# Patient Record
Sex: FEMALE | Race: OTHER
[De-identification: names, ages, dates, MRNs, and addresses within clinical notes are randomized per-mention and may not be internally consistent; named-entity substitution may affect disease eponyms.]

---

## 2019-12-05 ENCOUNTER — HOSPITAL ENCOUNTER (OUTPATIENT)
Dept: HOSPITAL 15 - RAD HDHVI | Age: 69
Discharge: HOME | End: 2019-12-05
Attending: INTERNAL MEDICINE
Payer: MEDICARE

## 2019-12-05 VITALS — HEIGHT: 64 IN | WEIGHT: 118 LBS | BODY MASS INDEX: 20.14 KG/M2

## 2019-12-05 DIAGNOSIS — E78.00: ICD-10-CM

## 2019-12-05 DIAGNOSIS — E11.9: Primary | ICD-10-CM

## 2019-12-05 DIAGNOSIS — I10: ICD-10-CM

## 2019-12-05 DIAGNOSIS — E78.5: ICD-10-CM

## 2019-12-05 PROCEDURE — 93017 CV STRESS TEST TRACING ONLY: CPT

## 2019-12-05 PROCEDURE — 78452 HT MUSCLE IMAGE SPECT MULT: CPT

## 2019-12-05 PROCEDURE — 96374 THER/PROPH/DIAG INJ IV PUSH: CPT

## 2019-12-18 ENCOUNTER — HOSPITAL ENCOUNTER (OUTPATIENT)
Dept: HOSPITAL 15 - RAD HDHVI | Age: 69
Discharge: HOME | End: 2019-12-18
Attending: INTERNAL MEDICINE
Payer: MEDICARE

## 2019-12-18 DIAGNOSIS — R00.2: ICD-10-CM

## 2019-12-18 DIAGNOSIS — I08.1: Primary | ICD-10-CM

## 2019-12-18 DIAGNOSIS — R07.9: ICD-10-CM

## 2019-12-18 PROCEDURE — 93306 TTE W/DOPPLER COMPLETE: CPT

## 2020-07-08 ENCOUNTER — HOSPITAL ENCOUNTER (INPATIENT)
Dept: HOSPITAL 15 - TELE-WESTW | Age: 70
LOS: 7 days | Discharge: ANOTHER HOSPITAL | DRG: 287 | End: 2020-07-15
Attending: INTERNAL MEDICINE | Admitting: INTERNAL MEDICINE
Payer: MEDICARE

## 2020-07-08 VITALS — WEIGHT: 127.43 LBS | BODY MASS INDEX: 23.45 KG/M2 | HEIGHT: 62 IN

## 2020-07-08 VITALS — SYSTOLIC BLOOD PRESSURE: 118 MMHG | DIASTOLIC BLOOD PRESSURE: 74 MMHG

## 2020-07-08 DIAGNOSIS — K21.9: ICD-10-CM

## 2020-07-08 DIAGNOSIS — Z79.84: ICD-10-CM

## 2020-07-08 DIAGNOSIS — I10: ICD-10-CM

## 2020-07-08 DIAGNOSIS — E78.5: ICD-10-CM

## 2020-07-08 DIAGNOSIS — I25.110: Primary | ICD-10-CM

## 2020-07-08 DIAGNOSIS — Z03.818: ICD-10-CM

## 2020-07-08 DIAGNOSIS — E11.9: ICD-10-CM

## 2020-07-08 PROCEDURE — 99152 MOD SED SAME PHYS/QHP 5/>YRS: CPT

## 2020-07-08 PROCEDURE — 86850 RBC ANTIBODY SCREEN: CPT

## 2020-07-08 PROCEDURE — 93970 EXTREMITY STUDY: CPT

## 2020-07-08 PROCEDURE — 36224 PLACE CATH CAROTD ART: CPT

## 2020-07-08 PROCEDURE — 85610 PROTHROMBIN TIME: CPT

## 2020-07-08 PROCEDURE — 93005 ELECTROCARDIOGRAM TRACING: CPT

## 2020-07-08 PROCEDURE — 85025 COMPLETE CBC W/AUTO DIFF WBC: CPT

## 2020-07-08 PROCEDURE — 93458 L HRT ARTERY/VENTRICLE ANGIO: CPT

## 2020-07-08 PROCEDURE — 71045 X-RAY EXAM CHEST 1 VIEW: CPT

## 2020-07-08 PROCEDURE — 36227 PLACE CATH XTRNL CAROTID: CPT

## 2020-07-08 PROCEDURE — 85730 THROMBOPLASTIN TIME PARTIAL: CPT

## 2020-07-08 PROCEDURE — 82962 GLUCOSE BLOOD TEST: CPT

## 2020-07-08 PROCEDURE — 81001 URINALYSIS AUTO W/SCOPE: CPT

## 2020-07-08 PROCEDURE — 80053 COMPREHEN METABOLIC PANEL: CPT

## 2020-07-08 PROCEDURE — 36415 COLL VENOUS BLD VENIPUNCTURE: CPT

## 2020-07-08 PROCEDURE — 86901 BLOOD TYPING SEROLOGIC RH(D): CPT

## 2020-07-08 PROCEDURE — 86900 BLOOD TYPING SEROLOGIC ABO: CPT

## 2020-07-08 PROCEDURE — 80048 BASIC METABOLIC PNL TOTAL CA: CPT

## 2020-07-08 NOTE — NUR
Direct Admit from [facility]

YING SAUCEDO admitted to Telemetry unit after SBAR received from DILLON Tyson from [facility].  
Patient oriented to MARTA BOLAÑOS, RN primary RN, unit, room, bed, and unit policies 
regarding patient care and visiting hours. Patient was placed on continuous telemetry 
monitoring, tele box #64  and telemetry reading is SR77 . Patient placed on bedside oxygen, 
weighed by bedscale and encouraged to call if they need something. All questions and 
concerns addressed, patient verbalized understanding.

## 2020-07-09 VITALS — SYSTOLIC BLOOD PRESSURE: 104 MMHG | DIASTOLIC BLOOD PRESSURE: 60 MMHG

## 2020-07-09 VITALS — SYSTOLIC BLOOD PRESSURE: 108 MMHG | DIASTOLIC BLOOD PRESSURE: 69 MMHG

## 2020-07-09 VITALS — SYSTOLIC BLOOD PRESSURE: 130 MMHG | DIASTOLIC BLOOD PRESSURE: 70 MMHG

## 2020-07-09 VITALS — SYSTOLIC BLOOD PRESSURE: 131 MMHG | DIASTOLIC BLOOD PRESSURE: 82 MMHG

## 2020-07-09 LAB
ALBUMIN SERPL-MCNC: 4 G/DL (ref 3.4–5)
ALP SERPL-CCNC: 85 U/L (ref 45–117)
ALT SERPL-CCNC: 29 U/L (ref 13–56)
ANION GAP SERPL CALCULATED.3IONS-SCNC: 4 MMOL/L (ref 5–15)
APTT PPP: 106.5 SEC (ref 23.64–32.05)
APTT PPP: 27.9 SEC (ref 23.64–32.05)
APTT PPP: 40.6 SEC (ref 23.64–32.05)
APTT PPP: 40.9 SEC (ref 23.64–32.05)
BILIRUB SERPL-MCNC: 0.7 MG/DL (ref 0.2–1)
BUN SERPL-MCNC: 18 MG/DL (ref 7–18)
BUN/CREAT SERPL: 23.4
CALCIUM SERPL-MCNC: 9.5 MG/DL (ref 8.5–10.1)
CHLORIDE SERPL-SCNC: 106 MMOL/L (ref 98–107)
CO2 SERPL-SCNC: 29 MMOL/L (ref 21–32)
GLUCOSE SERPL-MCNC: 113 MG/DL (ref 74–106)
HCT VFR BLD AUTO: 41.2 % (ref 36–46)
HCT VFR BLD AUTO: 41.4 % (ref 36–46)
HCT VFR BLD AUTO: 44.9 % (ref 36–46)
HGB BLD-MCNC: 13.9 G/DL (ref 12.2–16.2)
HGB BLD-MCNC: 14.1 G/DL (ref 12.2–16.2)
HGB BLD-MCNC: 15.2 G/DL (ref 12.2–16.2)
INR PPP: 1.02 (ref 0.9–1.15)
INR PPP: 1.07 (ref 0.9–1.15)
INR PPP: 1.08 (ref 0.9–1.15)
INR PPP: 1.13 (ref 0.9–1.15)
MCH RBC QN AUTO: 30 PG (ref 28–32)
MCH RBC QN AUTO: 30.1 PG (ref 28–32)
MCH RBC QN AUTO: 30.5 PG (ref 28–32)
MCV RBC AUTO: 88.8 FL (ref 80–100)
MCV RBC AUTO: 89 FL (ref 80–100)
MCV RBC AUTO: 89.4 FL (ref 80–100)
NRBC BLD QL AUTO: 0 %
NRBC BLD QL AUTO: 0.1 %
NRBC BLD QL AUTO: 0.8 %
POTASSIUM SERPL-SCNC: 4.4 MMOL/L (ref 3.5–5.1)
PROT SERPL-MCNC: 8 G/DL (ref 6.4–8.2)
SODIUM SERPL-SCNC: 139 MMOL/L (ref 136–145)

## 2020-07-09 PROCEDURE — 4A023N7 MEASUREMENT OF CARDIAC SAMPLING AND PRESSURE, LEFT HEART, PERCUTANEOUS APPROACH: ICD-10-PCS | Performed by: INTERNAL MEDICINE

## 2020-07-09 PROCEDURE — B31C1ZZ FLUOROSCOPY OF BILATERAL EXTERNAL CAROTID ARTERIES USING LOW OSMOLAR CONTRAST: ICD-10-PCS | Performed by: INTERNAL MEDICINE

## 2020-07-09 PROCEDURE — B2111ZZ FLUOROSCOPY OF MULTIPLE CORONARY ARTERIES USING LOW OSMOLAR CONTRAST: ICD-10-PCS | Performed by: INTERNAL MEDICINE

## 2020-07-09 PROCEDURE — B3151ZZ FLUOROSCOPY OF BILATERAL COMMON CAROTID ARTERIES USING LOW OSMOLAR CONTRAST: ICD-10-PCS | Performed by: INTERNAL MEDICINE

## 2020-07-09 PROCEDURE — B3181ZZ FLUOROSCOPY OF BILATERAL INTERNAL CAROTID ARTERIES USING LOW OSMOLAR CONTRAST: ICD-10-PCS | Performed by: INTERNAL MEDICINE

## 2020-07-09 PROCEDURE — B2151ZZ FLUOROSCOPY OF LEFT HEART USING LOW OSMOLAR CONTRAST: ICD-10-PCS | Performed by: INTERNAL MEDICINE

## 2020-07-09 PROCEDURE — B3121ZZ FLUOROSCOPY OF LEFT SUBCLAVIAN ARTERY USING LOW OSMOLAR CONTRAST: ICD-10-PCS | Performed by: INTERNAL MEDICINE

## 2020-07-09 RX ADMIN — PANTOPRAZOLE SODIUM SCH MG: 40 INJECTION, POWDER, FOR SOLUTION INTRAVENOUS at 15:16

## 2020-07-09 RX ADMIN — ALUMINUM HYDROXIDE, MAGNESIUM HYDROXIDE, AND SIMETHICONE PRN ML: 200; 200; 20 SUSPENSION ORAL at 22:33

## 2020-07-09 RX ADMIN — SODIUM CHLORIDE SCH ML: 9 INJECTION INTRAMUSCULAR; INTRAVENOUS; SUBCUTANEOUS at 05:48

## 2020-07-09 RX ADMIN — Medication SCH STRIP: at 22:19

## 2020-07-09 RX ADMIN — HYDROCODONE BITARTRATE AND ACETAMINOPHEN PRN TAB: 5; 325 TABLET ORAL at 13:38

## 2020-07-09 RX ADMIN — HEPARIN SODIUM AND DEXTROSE SCH MLS/HR: 10000; 5 INJECTION INTRAVENOUS at 15:51

## 2020-07-09 RX ADMIN — Medication SCH STRIP: at 16:50

## 2020-07-09 RX ADMIN — HUMAN INSULIN SCH UNITS: 100 INJECTION, SOLUTION SUBCUTANEOUS at 22:00

## 2020-07-09 RX ADMIN — MORPHINE SULFATE PRN MG: 2 INJECTION, SOLUTION INTRAMUSCULAR; INTRAVENOUS at 19:10

## 2020-07-09 RX ADMIN — SODIUM CHLORIDE SCH ML: 9 INJECTION INTRAMUSCULAR; INTRAVENOUS; SUBCUTANEOUS at 22:19

## 2020-07-09 RX ADMIN — SODIUM CHLORIDE SCH MLS/HR: 0.9 INJECTION, SOLUTION INTRAVENOUS at 14:35

## 2020-07-09 RX ADMIN — ALUMINUM HYDROXIDE, MAGNESIUM HYDROXIDE, AND SIMETHICONE PRN ML: 200; 200; 20 SUSPENSION ORAL at 22:26

## 2020-07-09 RX ADMIN — SODIUM CHLORIDE SCH ML: 9 INJECTION INTRAMUSCULAR; INTRAVENOUS; SUBCUTANEOUS at 14:35

## 2020-07-09 RX ADMIN — HUMAN INSULIN SCH UNITS: 100 INJECTION, SOLUTION SUBCUTANEOUS at 16:49

## 2020-07-09 RX ADMIN — SODIUM CHLORIDE SCH MLS/HR: 0.9 INJECTION, SOLUTION INTRAVENOUS at 01:16

## 2020-07-09 NOTE — NUR
Patient sat up in bed, the Cath site in Right groin has gauze that is minimally saturated. I 
told patient that she should not attempt to walk quite yet because she is on the heparin 
drip and the groin site took a while to clot. Patient was put on a bed pan to attempt to 
urinate.

## 2020-07-09 NOTE — NUR
Spoke to the patient to reiterate that she is under a physicians care and currently 
receiving Heparin post heart cath. She then paused our discussion and called her sister. 
They added the password "Nurse" to the account. Explained to the family that the physician 
has placed a consult for the cardiothoracic surgeon and she has yet to be seen. I explained 
the risk of leaving AMA, and that she should remain and make her wishes known to the 
physician. Paged the physician for medication for anxiety, and acid reflux. Awaiting orders.

## 2020-07-09 NOTE — NUR
Patient aPTT 40.9, Heparin increased by 2 mL/hr rate now 10 mL/hr. Next draw will be at 
0515. Will continue to monitor.

## 2020-07-09 NOTE — NUR
Patient complaining of pain. Patient requesting to transfer to [facility]. Patient 
medicated for pain. Will continue to monitor.

## 2020-07-09 NOTE — NUR
RESP

UNABLE TO PERFORM BED SIDE SPIROMETRY DUE TO PT NOT BEING AWAKE ENOUGH TO FOLLOW 
INSTRUCTIONS FOR TEST.

## 2020-07-09 NOTE — NUR
paged dr cortez for clarification on heparin drip, abhi wants to know if he wants them to 
use the heparin protocol here. Also need to know if dr cortez is ok with bedside spirometry 
instead of PFT.

## 2020-07-09 NOTE — NUR
Patient's  called requesting information. No password on the account. Family wanting 
to know how can the patient transfer to [facility]. I explained the physician needs 
to request the transfer so that another physician can accept the case. I also explained if 
the patient leaves AMA there is a risk to leaving the hospital against medical advice. I 
informed the  that the cardiothoracic surgeon has been consulted and she has yet to 
be seen.  verbalized understanding. Will continue to  monitor.

## 2020-07-10 VITALS — SYSTOLIC BLOOD PRESSURE: 104 MMHG | DIASTOLIC BLOOD PRESSURE: 62 MMHG

## 2020-07-10 VITALS — SYSTOLIC BLOOD PRESSURE: 138 MMHG | DIASTOLIC BLOOD PRESSURE: 78 MMHG

## 2020-07-10 VITALS — DIASTOLIC BLOOD PRESSURE: 90 MMHG | SYSTOLIC BLOOD PRESSURE: 141 MMHG

## 2020-07-10 VITALS — SYSTOLIC BLOOD PRESSURE: 107 MMHG | DIASTOLIC BLOOD PRESSURE: 59 MMHG

## 2020-07-10 VITALS — SYSTOLIC BLOOD PRESSURE: 123 MMHG | DIASTOLIC BLOOD PRESSURE: 78 MMHG

## 2020-07-10 LAB
ANION GAP SERPL CALCULATED.3IONS-SCNC: 8 MMOL/L (ref 5–15)
APTT PPP: 25.6 SEC (ref 23.64–32.05)
BUN SERPL-MCNC: 8 MG/DL (ref 7–18)
BUN/CREAT SERPL: 14.5
CALCIUM SERPL-MCNC: 8.4 MG/DL (ref 8.5–10.1)
CHLORIDE SERPL-SCNC: 107 MMOL/L (ref 98–107)
CO2 SERPL-SCNC: 23 MMOL/L (ref 21–32)
GLUCOSE SERPL-MCNC: 154 MG/DL (ref 74–106)
HCT VFR BLD AUTO: 41.9 % (ref 36–46)
HGB BLD-MCNC: 14 G/DL (ref 12.2–16.2)
INR PPP: 1.04 (ref 0.9–1.15)
MCH RBC QN AUTO: 30.1 PG (ref 28–32)
MCV RBC AUTO: 90.1 FL (ref 80–100)
NRBC BLD QL AUTO: 0.1 %
POTASSIUM SERPL-SCNC: 3.4 MMOL/L (ref 3.5–5.1)
SODIUM SERPL-SCNC: 138 MMOL/L (ref 136–145)

## 2020-07-10 RX ADMIN — ALUMINUM HYDROXIDE, MAGNESIUM HYDROXIDE, AND SIMETHICONE PRN ML: 200; 200; 20 SUSPENSION ORAL at 06:06

## 2020-07-10 RX ADMIN — SODIUM CHLORIDE SCH ML: 9 INJECTION INTRAMUSCULAR; INTRAVENOUS; SUBCUTANEOUS at 23:48

## 2020-07-10 RX ADMIN — Medication SCH STRIP: at 21:27

## 2020-07-10 RX ADMIN — HUMAN INSULIN SCH UNITS: 100 INJECTION, SOLUTION SUBCUTANEOUS at 21:40

## 2020-07-10 RX ADMIN — SODIUM CHLORIDE SCH MLS/HR: 0.9 INJECTION, SOLUTION INTRAVENOUS at 21:28

## 2020-07-10 RX ADMIN — NITROGLYCERIN SCH PATCH: 0.2 PATCH TRANSDERMAL at 09:48

## 2020-07-10 RX ADMIN — HUMAN INSULIN SCH UNITS: 100 INJECTION, SOLUTION SUBCUTANEOUS at 12:30

## 2020-07-10 RX ADMIN — SODIUM CHLORIDE SCH MLS/HR: 0.9 INJECTION, SOLUTION INTRAVENOUS at 02:55

## 2020-07-10 RX ADMIN — AMIODARONE HYDROCHLORIDE SCH MG: 200 TABLET ORAL at 09:48

## 2020-07-10 RX ADMIN — HEPARIN SODIUM AND DEXTROSE SCH MLS/HR: 10000; 5 INJECTION INTRAVENOUS at 12:52

## 2020-07-10 RX ADMIN — SODIUM CHLORIDE SCH ML: 9 INJECTION INTRAMUSCULAR; INTRAVENOUS; SUBCUTANEOUS at 06:06

## 2020-07-10 RX ADMIN — MORPHINE SULFATE PRN MG: 2 INJECTION, SOLUTION INTRAMUSCULAR; INTRAVENOUS at 03:25

## 2020-07-10 RX ADMIN — HUMAN INSULIN SCH UNITS: 100 INJECTION, SOLUTION SUBCUTANEOUS at 07:00

## 2020-07-10 RX ADMIN — HUMAN INSULIN SCH UNITS: 100 INJECTION, SOLUTION SUBCUTANEOUS at 17:15

## 2020-07-10 RX ADMIN — MORPHINE SULFATE PRN MG: 2 INJECTION, SOLUTION INTRAMUSCULAR; INTRAVENOUS at 00:28

## 2020-07-10 RX ADMIN — SODIUM CHLORIDE SCH ML: 9 INJECTION INTRAMUSCULAR; INTRAVENOUS; SUBCUTANEOUS at 21:27

## 2020-07-10 RX ADMIN — PANTOPRAZOLE SODIUM SCH MG: 40 INJECTION, POWDER, FOR SOLUTION INTRAVENOUS at 09:47

## 2020-07-10 RX ADMIN — SODIUM CHLORIDE SCH MLS/HR: 0.9 INJECTION, SOLUTION INTRAVENOUS at 16:25

## 2020-07-10 RX ADMIN — Medication SCH STRIP: at 17:14

## 2020-07-10 RX ADMIN — Medication SCH STRIP: at 06:58

## 2020-07-10 RX ADMIN — ALUMINUM HYDROXIDE, MAGNESIUM HYDROXIDE, AND SIMETHICONE PRN ML: 200; 200; 20 SUSPENSION ORAL at 17:31

## 2020-07-10 RX ADMIN — SODIUM CHLORIDE SCH ML: 9 INJECTION INTRAMUSCULAR; INTRAVENOUS; SUBCUTANEOUS at 14:24

## 2020-07-10 RX ADMIN — Medication SCH STRIP: at 12:30

## 2020-07-10 NOTE — NUR
Tried to call Kylah and put [facility] on will call, but there was no answer and voicemail is 
full.

-------------------------------------------------------------------------------

Addendum: 07/10/20 at 1620 by ERIC SAHA RN RN

-------------------------------------------------------------------------------

[place]

## 2020-07-10 NOTE — NUR
Received a call that the patient is being transferred to [place] for cardiothoracic 
with Dr. Blandon. Paged Dr. Zaman for discharge orders and to advise if the patient and her 
family have been notified.

## 2020-07-10 NOTE — NUR
Received a call from [facility] transfer [place]. The accepting physician is Dr. Janes Ocampo. 
Transfer center phone number is [phone]. Fax # is [phone]. Faxed over the face 
sheet, H&P, and the last progress note. Rep said she will work on the transfer on Monday.

## 2020-07-10 NOTE — NUR
PATIENT UP TO BATHROOM, PATIENT EXCLAIMED SHE HEARD A POP IN HER RIGHT GROIN. PATIENT HAS A 
SMALL HEMATOMA WITH A SMALL TRICKLE BLEED. PRESSURE APPLIED TO SITE TO STOP BLEEDING. PAGING 
DR. YAN. AWAITING ORDERS.

## 2020-07-10 NOTE — NUR
Endorsed care to Christal CARRANZA, examined Right groin. Right groin no longer bleeding. Awaiting 
orders.

## 2020-07-10 NOTE — NUR
6509 7/10/20 - Contacted by Dr Blandon's nurse Jasmina, who request clinicals and face sheet on 
patient. Jasmina stated if bed becomes available today, she will transfer patient to [place].  I faxed to Jasmina at [phone] requested documents.

## 2020-07-10 NOTE — NUR
1547 7/10/20 - Contacted by [facility] transfer center, patient has been 
accepted and assigned bed 222-A main tele unit, nurse's station [phone]. Address is 
[address] [place], [place] [id number].  Please send CD angiogram with patient.  
Will place patient on [facility] will call.

## 2020-07-10 NOTE — NUR
Patient complained of CP 7/10, non-radiating. EKG performed, patient placed on 1L NC, and 
morphine provided. Patient currently resting in bed with eyes closed free of distress. Will 
continue to monitor.

## 2020-07-10 NOTE — NUR
Opening Shift Note

Assumed care of patient, awake and alert/oriented x4. 

No S/S of distress/SOB or pain. Bed in lowest locked position, safety precautions in place, 
call light within reach.

Instructed on POC and to call for assist PRN, will continue to monitor for changes Q1hr and 
PRN.

## 2020-07-10 NOTE — NUR
[facility] called needed the medicare form and the correct Medicare #. Put transport on will call 
because the patient is not decided on location.

## 2020-07-10 NOTE — NUR
Dr Zaman called and spoke to the patient about the transfer. Patient states that is not sure 
if she wants to be transferred to [place] [facility] and may want to go to [facility] 
instead. Dr Zaman informed the patient that it would take a week to be transferred to 
[facility]. Patient is going to call her family and let me know her decision.

## 2020-07-11 VITALS — DIASTOLIC BLOOD PRESSURE: 74 MMHG | SYSTOLIC BLOOD PRESSURE: 119 MMHG

## 2020-07-11 VITALS — SYSTOLIC BLOOD PRESSURE: 133 MMHG | DIASTOLIC BLOOD PRESSURE: 79 MMHG

## 2020-07-11 VITALS — DIASTOLIC BLOOD PRESSURE: 68 MMHG | SYSTOLIC BLOOD PRESSURE: 119 MMHG

## 2020-07-11 VITALS — DIASTOLIC BLOOD PRESSURE: 77 MMHG | SYSTOLIC BLOOD PRESSURE: 117 MMHG

## 2020-07-11 RX ADMIN — MORPHINE SULFATE PRN MG: 2 INJECTION, SOLUTION INTRAMUSCULAR; INTRAVENOUS at 08:10

## 2020-07-11 RX ADMIN — HUMAN INSULIN SCH UNITS: 100 INJECTION, SOLUTION SUBCUTANEOUS at 17:44

## 2020-07-11 RX ADMIN — Medication SCH STRIP: at 06:06

## 2020-07-11 RX ADMIN — Medication SCH STRIP: at 11:59

## 2020-07-11 RX ADMIN — SODIUM CHLORIDE SCH MLS/HR: 0.9 INJECTION, SOLUTION INTRAVENOUS at 17:45

## 2020-07-11 RX ADMIN — HEPARIN SODIUM AND DEXTROSE SCH MLS/HR: 10000; 5 INJECTION INTRAVENOUS at 12:52

## 2020-07-11 RX ADMIN — SODIUM CHLORIDE SCH ML: 9 INJECTION INTRAMUSCULAR; INTRAVENOUS; SUBCUTANEOUS at 21:56

## 2020-07-11 RX ADMIN — HYDROCODONE BITARTRATE AND ACETAMINOPHEN PRN TAB: 5; 325 TABLET ORAL at 20:40

## 2020-07-11 RX ADMIN — HUMAN INSULIN SCH UNITS: 100 INJECTION, SOLUTION SUBCUTANEOUS at 22:02

## 2020-07-11 RX ADMIN — Medication SCH STRIP: at 17:44

## 2020-07-11 RX ADMIN — HUMAN INSULIN SCH UNITS: 100 INJECTION, SOLUTION SUBCUTANEOUS at 11:59

## 2020-07-11 RX ADMIN — PANTOPRAZOLE SODIUM SCH MG: 40 INJECTION, POWDER, FOR SOLUTION INTRAVENOUS at 09:37

## 2020-07-11 RX ADMIN — HUMAN INSULIN SCH UNITS: 100 INJECTION, SOLUTION SUBCUTANEOUS at 06:06

## 2020-07-11 RX ADMIN — SODIUM CHLORIDE SCH ML: 9 INJECTION INTRAMUSCULAR; INTRAVENOUS; SUBCUTANEOUS at 15:38

## 2020-07-11 RX ADMIN — ALUMINUM HYDROXIDE, MAGNESIUM HYDROXIDE, AND SIMETHICONE PRN ML: 200; 200; 20 SUSPENSION ORAL at 07:14

## 2020-07-11 RX ADMIN — ALUMINUM HYDROXIDE, MAGNESIUM HYDROXIDE, AND SIMETHICONE PRN ML: 200; 200; 20 SUSPENSION ORAL at 20:39

## 2020-07-11 RX ADMIN — HYDROCODONE BITARTRATE AND ACETAMINOPHEN PRN TAB: 5; 325 TABLET ORAL at 15:38

## 2020-07-11 RX ADMIN — Medication SCH STRIP: at 21:56

## 2020-07-11 RX ADMIN — AMIODARONE HYDROCHLORIDE SCH MG: 200 TABLET ORAL at 09:38

## 2020-07-11 RX ADMIN — NITROGLYCERIN SCH PATCH: 0.2 PATCH TRANSDERMAL at 09:38

## 2020-07-11 NOTE — NUR
Received a call from [facility]. The representative Minip checked on Pt's 
status and confirmed that a bed will be possibly available on Monday.

## 2020-07-11 NOTE — NUR
RECEIVED REPORT FROM NIGHT NURSE. PATIENT RESTING IN BED, STATES THAT SHE FEELS LIKE SHE HAS 
INDIGESTION/ HEART BURN. WILL CONTINUE TO MONITOR.

## 2020-07-11 NOTE — NUR
Pain

Patient complaints on right middle abdominal pain and pain just below her right shoulder 
blade. Pain medication given and and hot packs offered. Will continue to monitor.

## 2020-07-11 NOTE — NUR
END OF SHIFT

PT. RESTING IN BED.

NO S/S DISTRESS AT THIS TIME.

PT. REPORTS HEARTBURN. MILK AND MAYLOX GIVEN PRIOR TO LEAVING. PT. STATES SHE IS FEELING 
COMFORTABLE. 

CARE ENDORSED TO DAY RN.

## 2020-07-11 NOTE — NUR
Opening Shift Note

Assumed care of patient. Patient is awake, alert, and oriented X 4.  No S/S of respiratory 
distress noted or reported. Patient reports RL abd and upper back pain 6 out 0f 10. Pain 
will be addressed per dr's order. 2 LPM NC. Patient safety measures in place bed in lowest 
position, brakes are locked, side rails up x 2, and call light within reach.  Patent 
instructed on POC and to call for assistance as needed. Will continue to monitor for changes 
Q1hr and PRN.

## 2020-07-11 NOTE — NUR
CHEST PAIN/ ABD PAIN

PATIENT STETS THAT SHE IS HAVING CHEST PAIN, LOWER RIGHT ABDOMINAL PAIN. PATIENT STATES THAT 
MORPHINE HELPS AND THAT THE MAALOX DID NOT HELP MUCH. MORPHINE GIVEN. WILL CONTINUE TO 
MONITOR.

## 2020-07-11 NOTE — NUR
CHEST PAIN/ ABD PAIN

PATIENT STATES SHE IS FEELING MUCH BETTER AND WANTS TO WALK AROUND FOR A WHILE. WILL 
CONTINUE TO MONITOR.

## 2020-07-11 NOTE — NUR
CHEST PAIN/ ABD PAIN

EKG TAKEN. VITALS STABLE /89, HR 93, O2 100% ON 2 L, RR 18. NITRO SUBLINGUAL GIVEN, 
SCHEDULED MORNING MEDICATIONS GIVEN. WILL CONTINUE TO MONITOR.

## 2020-07-12 VITALS — DIASTOLIC BLOOD PRESSURE: 63 MMHG | SYSTOLIC BLOOD PRESSURE: 96 MMHG

## 2020-07-12 VITALS — DIASTOLIC BLOOD PRESSURE: 78 MMHG | SYSTOLIC BLOOD PRESSURE: 115 MMHG

## 2020-07-12 VITALS — SYSTOLIC BLOOD PRESSURE: 96 MMHG | DIASTOLIC BLOOD PRESSURE: 63 MMHG

## 2020-07-12 VITALS — DIASTOLIC BLOOD PRESSURE: 59 MMHG | SYSTOLIC BLOOD PRESSURE: 95 MMHG

## 2020-07-12 VITALS — DIASTOLIC BLOOD PRESSURE: 74 MMHG | SYSTOLIC BLOOD PRESSURE: 115 MMHG

## 2020-07-12 RX ADMIN — PANTOPRAZOLE SODIUM SCH MG: 40 INJECTION, POWDER, FOR SOLUTION INTRAVENOUS at 09:40

## 2020-07-12 RX ADMIN — SODIUM CHLORIDE SCH ML: 9 INJECTION INTRAMUSCULAR; INTRAVENOUS; SUBCUTANEOUS at 22:54

## 2020-07-12 RX ADMIN — SODIUM CHLORIDE SCH ML: 9 INJECTION INTRAMUSCULAR; INTRAVENOUS; SUBCUTANEOUS at 05:40

## 2020-07-12 RX ADMIN — HYDROCODONE BITARTRATE AND ACETAMINOPHEN PRN TAB: 5; 325 TABLET ORAL at 13:40

## 2020-07-12 RX ADMIN — SODIUM CHLORIDE SCH ML: 9 INJECTION INTRAMUSCULAR; INTRAVENOUS; SUBCUTANEOUS at 14:04

## 2020-07-12 RX ADMIN — NITROGLYCERIN SCH PATCH: 0.2 PATCH TRANSDERMAL at 09:40

## 2020-07-12 RX ADMIN — HUMAN INSULIN SCH UNITS: 100 INJECTION, SOLUTION SUBCUTANEOUS at 17:11

## 2020-07-12 RX ADMIN — ALUMINUM HYDROXIDE, MAGNESIUM HYDROXIDE, AND SIMETHICONE PRN ML: 200; 200; 20 SUSPENSION ORAL at 22:07

## 2020-07-12 RX ADMIN — AMIODARONE HYDROCHLORIDE SCH MG: 200 TABLET ORAL at 09:41

## 2020-07-12 RX ADMIN — Medication SCH STRIP: at 06:26

## 2020-07-12 RX ADMIN — SODIUM CHLORIDE SCH MLS/HR: 0.9 INJECTION, SOLUTION INTRAVENOUS at 21:33

## 2020-07-12 RX ADMIN — Medication SCH STRIP: at 21:50

## 2020-07-12 RX ADMIN — HYDROCODONE BITARTRATE AND ACETAMINOPHEN PRN TAB: 5; 325 TABLET ORAL at 22:07

## 2020-07-12 RX ADMIN — HYDROCODONE BITARTRATE AND ACETAMINOPHEN PRN TAB: 5; 325 TABLET ORAL at 08:25

## 2020-07-12 RX ADMIN — Medication SCH STRIP: at 17:10

## 2020-07-12 RX ADMIN — HUMAN INSULIN SCH UNITS: 100 INJECTION, SOLUTION SUBCUTANEOUS at 11:45

## 2020-07-12 RX ADMIN — SODIUM CHLORIDE SCH MLS/HR: 0.9 INJECTION, SOLUTION INTRAVENOUS at 08:25

## 2020-07-12 RX ADMIN — ONDANSETRON HYDROCHLORIDE PRN MG: 2 INJECTION, SOLUTION INTRAMUSCULAR; INTRAVENOUS at 06:57

## 2020-07-12 RX ADMIN — ONDANSETRON HYDROCHLORIDE PRN MG: 2 INJECTION, SOLUTION INTRAMUSCULAR; INTRAVENOUS at 23:31

## 2020-07-12 RX ADMIN — Medication SCH STRIP: at 11:45

## 2020-07-12 RX ADMIN — HEPARIN SODIUM AND DEXTROSE SCH MLS/HR: 10000; 5 INJECTION INTRAVENOUS at 11:46

## 2020-07-12 RX ADMIN — MORPHINE SULFATE PRN MG: 2 INJECTION, SOLUTION INTRAMUSCULAR; INTRAVENOUS at 23:31

## 2020-07-12 RX ADMIN — HUMAN INSULIN SCH UNITS: 100 INJECTION, SOLUTION SUBCUTANEOUS at 21:55

## 2020-07-12 RX ADMIN — HUMAN INSULIN SCH UNITS: 100 INJECTION, SOLUTION SUBCUTANEOUS at 06:30

## 2020-07-12 NOTE — NUR
RECEIVED REPORT FROM NIGHT NURSE. PATIENT RESTING IN BED, NO DISTRESS NOTED. WILL CONTINUE 
TO MONITOR.

## 2020-07-12 NOTE — NUR
Nutrition Assessment Notes



please see attached link for complete assessment



Est Energy needs BW 56 [id number] kcals (25-30 kcal/kgBW), Est Protein needs: 56-67 
gms/day (1.0-1.2 gm/kgBW). Will continue to monitor and reassess prn.




-------------------------------------------------------------------------------

Addendum: 20 at 1500 by Leatha Ann RD

-------------------------------------------------------------------------------

Amended: Links added.

## 2020-07-12 NOTE — NUR
Opening Shift Note

Assumed care of patient. Patient is awake, alert, and oriented X 4.  No S/S of respiratory 
distress, no pain noted or reported. 2 LPM NC. Patient safety measures in place bed in 
lowest position, brakes are locked, side rails up x 2, and call light within reach.  Patent 
instructed on POC and to call for assistance as needed. Will continue to monitor for changes 
Q1hr and PRN.

## 2020-07-13 VITALS — SYSTOLIC BLOOD PRESSURE: 95 MMHG | DIASTOLIC BLOOD PRESSURE: 62 MMHG

## 2020-07-13 VITALS — DIASTOLIC BLOOD PRESSURE: 62 MMHG | SYSTOLIC BLOOD PRESSURE: 91 MMHG

## 2020-07-13 VITALS — DIASTOLIC BLOOD PRESSURE: 69 MMHG | SYSTOLIC BLOOD PRESSURE: 99 MMHG

## 2020-07-13 VITALS — DIASTOLIC BLOOD PRESSURE: 62 MMHG | SYSTOLIC BLOOD PRESSURE: 92 MMHG

## 2020-07-13 VITALS — DIASTOLIC BLOOD PRESSURE: 58 MMHG | SYSTOLIC BLOOD PRESSURE: 90 MMHG

## 2020-07-13 RX ADMIN — SODIUM CHLORIDE SCH ML: 9 INJECTION INTRAMUSCULAR; INTRAVENOUS; SUBCUTANEOUS at 14:00

## 2020-07-13 RX ADMIN — Medication PRN MG: at 20:11

## 2020-07-13 RX ADMIN — SODIUM CHLORIDE SCH MLS/HR: 0.9 INJECTION, SOLUTION INTRAVENOUS at 23:48

## 2020-07-13 RX ADMIN — ONDANSETRON HYDROCHLORIDE PRN MG: 2 INJECTION, SOLUTION INTRAMUSCULAR; INTRAVENOUS at 11:29

## 2020-07-13 RX ADMIN — HYDROCODONE BITARTRATE AND ACETAMINOPHEN PRN TAB: 5; 325 TABLET ORAL at 10:43

## 2020-07-13 RX ADMIN — HUMAN INSULIN SCH UNITS: 100 INJECTION, SOLUTION SUBCUTANEOUS at 22:32

## 2020-07-13 RX ADMIN — NITROGLYCERIN SCH PATCH: 0.2 PATCH TRANSDERMAL at 10:00

## 2020-07-13 RX ADMIN — Medication SCH STRIP: at 18:08

## 2020-07-13 RX ADMIN — AMIODARONE HYDROCHLORIDE SCH MG: 200 TABLET ORAL at 09:03

## 2020-07-13 RX ADMIN — Medication PRN MG: at 02:11

## 2020-07-13 RX ADMIN — ALUMINUM HYDROXIDE, MAGNESIUM HYDROXIDE, AND SIMETHICONE PRN ML: 200; 200; 20 SUSPENSION ORAL at 05:27

## 2020-07-13 RX ADMIN — HUMAN INSULIN SCH UNITS: 100 INJECTION, SOLUTION SUBCUTANEOUS at 18:07

## 2020-07-13 RX ADMIN — SODIUM CHLORIDE SCH ML: 9 INJECTION INTRAMUSCULAR; INTRAVENOUS; SUBCUTANEOUS at 22:27

## 2020-07-13 RX ADMIN — Medication SCH STRIP: at 11:51

## 2020-07-13 RX ADMIN — SODIUM CHLORIDE SCH ML: 9 INJECTION INTRAMUSCULAR; INTRAVENOUS; SUBCUTANEOUS at 05:32

## 2020-07-13 RX ADMIN — ONDANSETRON HYDROCHLORIDE PRN MG: 2 INJECTION, SOLUTION INTRAMUSCULAR; INTRAVENOUS at 06:23

## 2020-07-13 RX ADMIN — PANTOPRAZOLE SODIUM SCH MG: 40 INJECTION, POWDER, FOR SOLUTION INTRAVENOUS at 09:02

## 2020-07-13 RX ADMIN — Medication SCH STRIP: at 22:28

## 2020-07-13 RX ADMIN — SODIUM CHLORIDE SCH MLS/HR: 0.9 INJECTION, SOLUTION INTRAVENOUS at 11:28

## 2020-07-13 RX ADMIN — Medication PRN MG: at 09:02

## 2020-07-13 RX ADMIN — HYDROCODONE BITARTRATE AND ACETAMINOPHEN PRN TAB: 5; 325 TABLET ORAL at 06:24

## 2020-07-13 RX ADMIN — ONDANSETRON HYDROCHLORIDE PRN MG: 2 INJECTION, SOLUTION INTRAMUSCULAR; INTRAVENOUS at 18:55

## 2020-07-13 RX ADMIN — Medication SCH STRIP: at 06:24

## 2020-07-13 RX ADMIN — HUMAN INSULIN SCH UNITS: 100 INJECTION, SOLUTION SUBCUTANEOUS at 06:26

## 2020-07-13 RX ADMIN — HUMAN INSULIN SCH UNITS: 100 INJECTION, SOLUTION SUBCUTANEOUS at 11:51

## 2020-07-13 NOTE — NUR
CHEST PAIN/ INDIGESTION

PATIENT COMPLAINING OF INDIGESTION AFTER EATING BREAKFAST, REQUESTING A TUMS. WILL CONTINUE 
TO MONITOR.

## 2020-07-13 NOTE — NUR
1150 7/13/20 - Contacted [facility] at [phone], Spoke with coordinator 
who stated patient was financially cleared this morning.  Requesting COVID 19 results, 
pending bed assignment.

## 2020-07-14 VITALS — DIASTOLIC BLOOD PRESSURE: 73 MMHG | SYSTOLIC BLOOD PRESSURE: 109 MMHG

## 2020-07-14 VITALS — DIASTOLIC BLOOD PRESSURE: 60 MMHG | SYSTOLIC BLOOD PRESSURE: 95 MMHG

## 2020-07-14 VITALS — DIASTOLIC BLOOD PRESSURE: 58 MMHG | SYSTOLIC BLOOD PRESSURE: 86 MMHG

## 2020-07-14 VITALS — DIASTOLIC BLOOD PRESSURE: 64 MMHG | SYSTOLIC BLOOD PRESSURE: 99 MMHG

## 2020-07-14 VITALS — SYSTOLIC BLOOD PRESSURE: 91 MMHG | DIASTOLIC BLOOD PRESSURE: 55 MMHG

## 2020-07-14 VITALS — SYSTOLIC BLOOD PRESSURE: 85 MMHG | DIASTOLIC BLOOD PRESSURE: 60 MMHG

## 2020-07-14 LAB
ALBUMIN SERPL-MCNC: 2.8 G/DL (ref 3.4–5)
ALP SERPL-CCNC: 78 U/L (ref 45–117)
ALT SERPL-CCNC: 64 U/L (ref 13–56)
ANION GAP SERPL CALCULATED.3IONS-SCNC: 9 MMOL/L (ref 5–15)
BILIRUB SERPL-MCNC: 1.1 MG/DL (ref 0.2–1)
BUN SERPL-MCNC: 20 MG/DL (ref 7–18)
BUN/CREAT SERPL: 31.3
CALCIUM SERPL-MCNC: 8.6 MG/DL (ref 8.5–10.1)
CHLORIDE SERPL-SCNC: 94 MMOL/L (ref 98–107)
CO2 SERPL-SCNC: 26 MMOL/L (ref 21–32)
GLUCOSE SERPL-MCNC: 164 MG/DL (ref 74–106)
HCT VFR BLD AUTO: 34.3 % (ref 36–46)
HGB BLD-MCNC: 11.9 G/DL (ref 12.2–16.2)
MCH RBC QN AUTO: 30.9 PG (ref 28–32)
MCV RBC AUTO: 89.2 FL (ref 80–100)
NRBC BLD QL AUTO: 0 %
POTASSIUM SERPL-SCNC: 4.2 MMOL/L (ref 3.5–5.1)
PROT SERPL-MCNC: 6.8 G/DL (ref 6.4–8.2)
SODIUM SERPL-SCNC: 129 MMOL/L (ref 136–145)

## 2020-07-14 RX ADMIN — SODIUM CHLORIDE SCH ML: 9 INJECTION INTRAMUSCULAR; INTRAVENOUS; SUBCUTANEOUS at 13:59

## 2020-07-14 RX ADMIN — Medication SCH STRIP: at 22:17

## 2020-07-14 RX ADMIN — SODIUM CHLORIDE SCH ML: 9 INJECTION INTRAMUSCULAR; INTRAVENOUS; SUBCUTANEOUS at 06:29

## 2020-07-14 RX ADMIN — AMIODARONE HYDROCHLORIDE SCH MG: 200 TABLET ORAL at 09:55

## 2020-07-14 RX ADMIN — SODIUM CHLORIDE SCH ML: 9 INJECTION INTRAMUSCULAR; INTRAVENOUS; SUBCUTANEOUS at 22:24

## 2020-07-14 RX ADMIN — HUMAN INSULIN SCH UNITS: 100 INJECTION, SOLUTION SUBCUTANEOUS at 22:23

## 2020-07-14 RX ADMIN — HUMAN INSULIN SCH UNITS: 100 INJECTION, SOLUTION SUBCUTANEOUS at 12:11

## 2020-07-14 RX ADMIN — Medication SCH STRIP: at 11:30

## 2020-07-14 RX ADMIN — SODIUM CHLORIDE SCH MLS/HR: 0.9 INJECTION, SOLUTION INTRAVENOUS at 13:59

## 2020-07-14 RX ADMIN — Medication SCH STRIP: at 06:38

## 2020-07-14 RX ADMIN — NITROGLYCERIN SCH PATCH: 0.2 PATCH TRANSDERMAL at 09:56

## 2020-07-14 RX ADMIN — ONDANSETRON HYDROCHLORIDE PRN MG: 2 INJECTION, SOLUTION INTRAMUSCULAR; INTRAVENOUS at 12:21

## 2020-07-14 RX ADMIN — HUMAN INSULIN SCH UNITS: 100 INJECTION, SOLUTION SUBCUTANEOUS at 17:26

## 2020-07-14 RX ADMIN — PANTOPRAZOLE SODIUM SCH MG: 40 INJECTION, POWDER, FOR SOLUTION INTRAVENOUS at 09:55

## 2020-07-14 RX ADMIN — HUMAN INSULIN SCH UNITS: 100 INJECTION, SOLUTION SUBCUTANEOUS at 06:41

## 2020-07-14 RX ADMIN — Medication SCH STRIP: at 17:21

## 2020-07-14 NOTE — NUR
Received call back from jeffy  per jeffy BLACKWELL will be transporting patient.

per jeffy BLACKWELL will call back with  time.

## 2020-07-14 NOTE — NUR
RECEIVED CALL FROM [facility] TO FAX COMPLETED DOCUMENTS FOR TRANSFER.  NEEDED COMPLETED SIGNATURE 
FROM MD.  OBTAINED SIGNATURE FROM CORRINE ALVAREZ.  DR YAN NOTIFIED AND GAVE TELEPHONE ORDER FOR 
VERIFICATION TO TRANSFER PATIENT WITH [facility] TO [facility].  FAXED COMPLETED 
DOCUMENTS TO [facility].  RECEIVED CALL BACK WITH  TIME OF 0100.  PATIENT NOTIFIED AND 
AWAITING TRANSFER.

## 2020-07-14 NOTE — NUR
Opening Shift Note

Assumed care of patient, awake, AAOx4.  No S/S of distress/SOB or pain.  On room air, 
ambulatory to BSC.  Bed in lowest locked position, side rails up x2, call light within 
reach.  Patient awaiting transfer to [facility].  Instructed on POC and to 
call for assist PRN, will continue to monitor for changes Q1hr and PRN.

## 2020-07-14 NOTE — NUR
RECEIVED ORDERS TO GIVE LOVENOX 60MG X1 CALLED DOCTOR YAN TO INFORM MD THAT PATIENT IS VERY 
BRUISE ON GROIN AND ASKED MD IF HE STILL WANTED PATIENT TO RECEIVED LOVENOX. PER DOCTOR YURIY 
PATENT NEEDS LOVENOX BEFORE TRANSPORT TO [facility].

## 2020-07-14 NOTE — NUR
1530 7/14/20- Faxed to [facility] at [phone], face sheet, 
H/P, labs, progress notes, patient has been accepted and assign bed 237-B. Please call 
report to [phone]. Please send CD with patient.

## 2020-07-15 NOTE — NUR
PATIENT TRANSFERRED WITH [facility], CARDIAC MONITOR AND 2L OXYGEN VIA NASAL CANNULA.  NO S/S OF 
DISTRESS, SOB OR PAIN NOTED.  ALL BELONGINGS TRANSFERRED WITH PATIENT.  ALL DOCUMENTS 
COMPLETED AND SIGNED.  IV REMOVED, CATHETER INTACT, PATIENT TOLERATED WELL.  TELEMETRY 
MONITOR REMOVED AND SENT BACK TO MONITOR TECH.